# Patient Record
Sex: FEMALE | Race: WHITE | NOT HISPANIC OR LATINO | Employment: FULL TIME | RURAL
[De-identification: names, ages, dates, MRNs, and addresses within clinical notes are randomized per-mention and may not be internally consistent; named-entity substitution may affect disease eponyms.]

---

## 2018-04-11 ENCOUNTER — OFFICE VISIT (OUTPATIENT)
Dept: OBSTETRICS AND GYNECOLOGY | Facility: CLINIC | Age: 43
End: 2018-04-11

## 2018-04-11 VITALS
SYSTOLIC BLOOD PRESSURE: 110 MMHG | HEIGHT: 62 IN | BODY MASS INDEX: 27.12 KG/M2 | DIASTOLIC BLOOD PRESSURE: 70 MMHG | WEIGHT: 147.4 LBS

## 2018-04-11 DIAGNOSIS — Z01.419 ENCOUNTER FOR ANNUAL ROUTINE GYNECOLOGICAL EXAMINATION: Primary | ICD-10-CM

## 2018-04-11 DIAGNOSIS — Z12.31 ENCOUNTER FOR SCREENING MAMMOGRAM FOR BREAST CANCER: ICD-10-CM

## 2018-04-11 DIAGNOSIS — R59.9 LYMPH NODE ENLARGEMENT: ICD-10-CM

## 2018-04-11 DIAGNOSIS — N76.0 BV (BACTERIAL VAGINOSIS): ICD-10-CM

## 2018-04-11 DIAGNOSIS — F34.1 DYSTHYMIA: Chronic | ICD-10-CM

## 2018-04-11 DIAGNOSIS — B96.89 BV (BACTERIAL VAGINOSIS): ICD-10-CM

## 2018-04-11 DIAGNOSIS — E66.3 OVERWEIGHT (BMI 25.0-29.9): Chronic | ICD-10-CM

## 2018-04-11 PROCEDURE — 87624 HPV HI-RISK TYP POOLED RSLT: CPT | Performed by: OBSTETRICS & GYNECOLOGY

## 2018-04-11 PROCEDURE — 99396 PREV VISIT EST AGE 40-64: CPT | Performed by: OBSTETRICS & GYNECOLOGY

## 2018-04-11 PROCEDURE — G0123 SCREEN CERV/VAG THIN LAYER: HCPCS | Performed by: OBSTETRICS & GYNECOLOGY

## 2018-04-11 RX ORDER — METRONIDAZOLE 500 MG/1
500 TABLET ORAL 3 TIMES DAILY
Qty: 21 TABLET | Refills: 0 | Status: SHIPPED | OUTPATIENT
Start: 2018-04-11 | End: 2018-04-18

## 2018-04-11 RX ORDER — LEVOTHYROXINE SODIUM 0.03 MG/1
25 TABLET ORAL DAILY
Refills: 5 | COMMUNITY
Start: 2018-04-03

## 2018-04-11 RX ORDER — BUPROPION HYDROCHLORIDE 150 MG/1
150 TABLET, EXTENDED RELEASE ORAL 2 TIMES DAILY
Qty: 60 TABLET | Refills: 11 | Status: SHIPPED | OUTPATIENT
Start: 2018-04-11

## 2018-04-11 RX ORDER — FLUCONAZOLE 150 MG/1
TABLET ORAL
Qty: 2 TABLET | Refills: 11 | Status: SHIPPED | OUTPATIENT
Start: 2018-04-11

## 2018-04-11 RX ORDER — DOXYCYCLINE 100 MG/1
100 TABLET ORAL 2 TIMES DAILY
Qty: 50 TABLET | Refills: 0 | Status: SHIPPED | OUTPATIENT
Start: 2018-04-11 | End: 2018-05-30 | Stop reason: HOSPADM

## 2018-04-12 NOTE — PROGRESS NOTES
Saba Cassidy is a 43 y.o. y/o female     Chief Complaint: Establish care, annual GYN exam and Pap smear, enlarged lymph node, lethargy, weight gain    HPI: 43-year-old  with one prior  delivery who had a total abdominal hysterectomy and bilateral salpingo-oophorectomy on 2014 for cervical dysplasia.    Dr. Daniele Low is her primary care provider.    She has had an enlarged lymph node in her right groin since 2017.  She has undergone 2 or 3 rounds of treatment with Augmentin without change in this lymph node.    She has hypothyroidism for which she takes Synthroid 25 µg per day.    She has lethargy, weight gain, and dysthymia.    No known drug allergies.    She is .    She does not smoke or use smokeless tobacco, and she never has.    Mother age 64 is diabetic and has hypothyroidism.  Father  at age 65 of a massive heart attack.  He had hypertension.  She has 43-year-old and 46-year-old siblings.  No family history of breast cancer, uterine cancer, ovarian cancer, or colon cancer.    We discussed trying Wellbutrin, and she is interested in trying this.    Review of Systems   Constitutional: Positive for fatigue. Negative for activity change, appetite change, chills, diaphoresis, fever and unexpected weight change.   Gastrointestinal: Negative for abdominal pain, constipation, diarrhea and nausea.   Genitourinary: Positive for vaginal discharge. Negative for difficulty urinating, dyspareunia, dysuria, pelvic pain, urgency, vaginal bleeding and vaginal pain.   Neurological: Negative for headaches.   Psychiatric/Behavioral: Positive for dysphoric mood. The patient is not nervous/anxious.    All other systems reviewed and are negative.     Breast ROS: negative    The following portions of the patient's history were reviewed and updated as appropriate: allergies, current medications, past family history, past medical history, past social history, past surgical  history and problem list.    No Known Allergies       Current Outpatient Prescriptions:   •  levothyroxine (SYNTHROID, LEVOTHROID) 25 MCG tablet, Take 25 mcg by mouth Daily., Disp: , Rfl: 5  •  buPROPion SR (WELLBUTRIN SR) 150 MG 12 hr tablet, Take 1 tablet by mouth 2 (Two) Times a Day., Disp: 60 tablet, Rfl: 11  •  doxycycline (ADOXA) 100 MG tablet, Take 1 tablet by mouth 2 (Two) Times a Day., Disp: 50 tablet, Rfl: 0  •  fluconazole (DIFLUCAN) 150 MG tablet, Take one po today and take one po in 4 days., Disp: 2 tablet, Rfl: 11  •  metroNIDAZOLE (FLAGYL) 500 MG tablet, Take 1 tablet by mouth 3 (Three) Times a Day for 7 days., Disp: 21 tablet, Rfl: 0     The patient has a family history of   Family History   Problem Relation Age of Onset   • Hypertension Father    • Heart attack Father    • Diabetes Mother    • Thyroid disease Mother         Past Medical History:   Diagnosis Date   • Cervical cancer    • Dysthymia 2018   • Lymph node enlargement 2018   • Overweight (BMI 25.0-29.9) 2018   • Thyroid disease         OB History      Para Term  AB Living    1 1  1  1    SAB TAB Ectopic Molar Multiple Live Births         1           Social History     Social History   • Marital status:      Spouse name: N/A   • Number of children: N/A   • Years of education: N/A     Occupational History   • Not on file.     Social History Main Topics   • Smoking status: Never Smoker   • Smokeless tobacco: Never Used   • Alcohol use No   • Drug use: No   • Sexual activity: Not on file     Other Topics Concern   • Not on file     Social History Narrative   • No narrative on file        Past Surgical History:   Procedure Laterality Date   • BREAST AUGMENTATION  2014   •  SECTION     • HYSTERECTOMY  2014   • WISDOM TOOTH EXTRACTION          Patient Active Problem List   Diagnosis   • Lymph node enlargement   • Dysthymia   • Overweight (BMI 25.0-29.9)        Documented Vitals     "04/11/18 1323   BP: 110/70   Weight: 66.9 kg (147 lb 6.4 oz)   Height: 157.5 cm (62\")       Physical Exam   Constitutional: She is oriented to person, place, and time. She appears well-developed and well-nourished. No distress.   HENT:   Head: Normocephalic and atraumatic.   Eyes: Conjunctivae and EOM are normal. Pupils are equal, round, and reactive to light.   Neck: Normal range of motion. Neck supple. No JVD present. No tracheal deviation present. No thyromegaly present.   Cardiovascular: Normal rate, regular rhythm, normal heart sounds and intact distal pulses.  Exam reveals no gallop and no friction rub.    No murmur heard.  Pulmonary/Chest: Effort normal and breath sounds normal. No stridor. No respiratory distress. She has no wheezes. She has no rales. She exhibits no tenderness. Right breast exhibits no inverted nipple, no mass, no nipple discharge, no skin change and no tenderness. Left breast exhibits no inverted nipple, no mass, no nipple discharge, no skin change and no tenderness. Breasts are symmetrical. There is no breast swelling.   Abdominal: Soft. Bowel sounds are normal. She exhibits no distension and no mass. There is no tenderness. There is no rebound and no guarding. No hernia. Hernia confirmed negative in the right inguinal area and confirmed negative in the left inguinal area.   Genitourinary: Rectal exam shows no external hemorrhoid, no internal hemorrhoid, no fissure, no mass, no tenderness, anal tone normal and guaiac negative stool. No breast tenderness, discharge or bleeding. No labial fusion. There is no rash, tenderness, lesion or injury on the right labia. There is no rash, tenderness, lesion or injury on the left labia. Vaginal discharge found.   Genitourinary Comments: Bacterial vaginosis.  Pap smear of the vaginal cuff performed.  Cervix surgically absent.  Uterus surgically absent.  Adnexa surgically absent.  No pelvic masses palpated.     Musculoskeletal: Normal range of motion. " She exhibits no edema, tenderness or deformity.   Lymphadenopathy:     She has no cervical adenopathy.        Right: No inguinal adenopathy present.        Left: No inguinal adenopathy present.   Neurological: She is alert and oriented to person, place, and time. She has normal reflexes. She displays normal reflexes. No cranial nerve deficit. She exhibits normal muscle tone. Coordination normal.   Skin: Skin is warm and dry. No rash noted. She is not diaphoretic. No erythema. No pallor.   Psychiatric: She has a normal mood and affect. Her behavior is normal. Judgment and thought content normal.   Nursing note and vitals reviewed.       Assessment        Diagnosis Plan   1. Encounter for annual routine gynecological examination  Liquid-based Pap Smear, Screening   2. Lymph node enlargement     3. BV (bacterial vaginosis)     4. Dysthymia     5. Overweight (BMI 25.0-29.9)     6. Encounter for screening mammogram for breast cancer  Mammo Screening Digital Tomosynthesis Bilateral With CAD         Plan      1. Wellbutrin  mg by mouth twice a day.  2. Flagyl and Diflucan.  3. Doxycycline.  4. Encouraged in diet and exercise.  5. Handouts on depression, hot flashes, exercise, and vitamin use.   6. Follow-up in 6 months.  Follow-up sooner as needed.  7. Note to Dr. Daniele Low.            This document has been electronically signed by Omari Dumont MD on April 11, 2018 7:18 PM

## 2018-04-18 LAB
LAB AP CASE REPORT: NORMAL
LAB AP GYN ADDITIONAL INFORMATION: NORMAL
LAB AP GYN OTHER FINDINGS: NORMAL
Lab: NORMAL
PATH INTERP SPEC-IMP: NORMAL
STAT OF ADQ CVX/VAG CYTO-IMP: NORMAL

## 2018-04-20 LAB — HPV I/H RISK 4 DNA CVX QL PROBE+SIG AMP: NEGATIVE

## 2018-05-30 ENCOUNTER — OFFICE VISIT (OUTPATIENT)
Dept: OBSTETRICS AND GYNECOLOGY | Facility: CLINIC | Age: 43
End: 2018-05-30

## 2018-05-30 VITALS
SYSTOLIC BLOOD PRESSURE: 104 MMHG | HEIGHT: 62 IN | DIASTOLIC BLOOD PRESSURE: 76 MMHG | BODY MASS INDEX: 24.99 KG/M2 | WEIGHT: 135.8 LBS

## 2018-05-30 DIAGNOSIS — R59.9 LYMPH NODE ENLARGEMENT: ICD-10-CM

## 2018-05-30 DIAGNOSIS — E66.3 OVERWEIGHT (BMI 25.0-29.9): Chronic | ICD-10-CM

## 2018-05-30 DIAGNOSIS — F34.1 DYSTHYMIA: Primary | ICD-10-CM

## 2018-05-30 PROCEDURE — 99213 OFFICE O/P EST LOW 20 MIN: CPT | Performed by: OBSTETRICS & GYNECOLOGY

## 2018-05-31 NOTE — PROGRESS NOTES
Saba Cassidy is a 43 y.o. y/o female     Chief Complaint: Resolution of enlarged lymph node, improvement and lethargy, and difficulty losing weight.    HPI: 43-year-old  with one prior  delivery who had a total abdominal hysterectomy and bilateral salpingo-oophorectomy on 2014 for cervical dysplasia.     Dr. Daniele Low is her primary care provider.     She has had an enlarged lymph node in her right groin since 2017.  She has undergone 2 or 3 rounds of treatment with Augmentin without change in this lymph node.     She has hypothyroidism for which she takes Synthroid 25 µg per day.     She has lethargy, weight gain, and dysthymia.     No known drug allergies.     She is .     She does not smoke or use smokeless tobacco, and she never has.     Mother age 64 is diabetic and has hypothyroidism.  Father  at age 65 of a massive heart attack.  He had hypertension.  She has 43-year-old and 46-year-old siblings.  No family history of breast cancer, uterine cancer, ovarian cancer, or colon cancer.     We discussed trying Wellbutrin, and she started Wellbutrin 2018.    May 30, 2018, she is taking the Wellbutrin  mg twice a day and is very happy with that.  She has been able to lose close to 12 pounds, and she feels very well.  She took the Flagyl and doxycycline, but had to stop the doxycycline after a few days.  Once she completed the Flagyl, she tried it again and could only take it up another 5 days, but the swollen lymph node in her right groin has resolved.    Review of Systems   Constitutional: Negative for activity change, appetite change, chills, diaphoresis, fatigue, fever and unexpected weight change.   Gastrointestinal: Negative for abdominal pain, constipation, diarrhea and nausea.   Genitourinary: Negative for difficulty urinating, dyspareunia, dysuria, pelvic pain, urgency, vaginal bleeding, vaginal discharge and vaginal pain.    Neurological: Negative for headaches.   Psychiatric/Behavioral: The patient is not nervous/anxious.    All other systems reviewed and are negative.     Breast ROS: negative    The following portions of the patient's history were reviewed and updated as appropriate: allergies, current medications, past family history, past medical history, past social history, past surgical history and problem list.    No Known Allergies       Current Outpatient Prescriptions:   •  buPROPion SR (WELLBUTRIN SR) 150 MG 12 hr tablet, Take 1 tablet by mouth 2 (Two) Times a Day., Disp: 60 tablet, Rfl: 11  •  levothyroxine (SYNTHROID, LEVOTHROID) 25 MCG tablet, Take 25 mcg by mouth Daily., Disp: , Rfl: 5  •  fluconazole (DIFLUCAN) 150 MG tablet, Take one po today and take one po in 4 days., Disp: 2 tablet, Rfl: 11     The patient has a family history of   Family History   Problem Relation Age of Onset   • Hypertension Father    • Heart attack Father    • Diabetes Mother    • Thyroid disease Mother         Past Medical History:   Diagnosis Date   • Cervical cancer    • Dysthymia 2018   • Lymph node enlargement 2018   • Overweight (BMI 25.0-29.9) 2018   • Thyroid disease         OB History      Para Term  AB Living    2 2 1 1   1    SAB TAB Ectopic Molar Multiple Live Births              1           Social History     Social History   • Marital status:      Spouse name: N/A   • Number of children: N/A   • Years of education: N/A     Occupational History   • Not on file.     Social History Main Topics   • Smoking status: Never Smoker   • Smokeless tobacco: Never Used   • Alcohol use No   • Drug use: No   • Sexual activity: Not on file     Other Topics Concern   • Not on file     Social History Narrative   • No narrative on file        Past Surgical History:   Procedure Laterality Date   • AUGMENTATION MAMMAPLASTY     • BREAST AUGMENTATION  2014   •  SECTION     • HYSTERECTOMY   "07/07/2014   • WISDOM TOOTH EXTRACTION          Patient Active Problem List   Diagnosis   • Dysthymia        Documented Vitals    05/30/18 0942   BP: 104/76   Weight: 61.6 kg (135 lb 12.8 oz)   Height: 157.5 cm (62\")       Physical Exam   Constitutional: She is oriented to person, place, and time. She appears well-developed and well-nourished. No distress.   135.8 pounds with BMI 24.8.   HENT:   Head: Normocephalic and atraumatic.   Eyes: Conjunctivae and EOM are normal. Pupils are equal, round, and reactive to light.   Neck: Normal range of motion. Neck supple. No JVD present. No tracheal deviation present. No thyromegaly present.   Cardiovascular: Normal rate, regular rhythm, normal heart sounds and intact distal pulses.  Exam reveals no gallop and no friction rub.    No murmur heard.  Pulmonary/Chest: Effort normal and breath sounds normal. No stridor. No respiratory distress. She has no wheezes. She has no rales. She exhibits no tenderness.   Abdominal: Soft. Bowel sounds are normal. She exhibits no distension and no mass. There is no tenderness. There is no rebound and no guarding. No hernia. Hernia confirmed negative in the right inguinal area and confirmed negative in the left inguinal area.   Genitourinary: Vagina normal. No labial fusion. There is no rash, tenderness, lesion or injury on the right labia. There is no rash, tenderness, lesion or injury on the left labia.   Genitourinary Comments: Adenopathy and vaginal discharge have resolved.   Musculoskeletal: Normal range of motion. She exhibits no edema, tenderness or deformity.   Lymphadenopathy:     She has no cervical adenopathy.        Right: No inguinal adenopathy present.        Left: No inguinal adenopathy present.   Neurological: She is alert and oriented to person, place, and time. She has normal reflexes. She displays normal reflexes. No cranial nerve deficit. She exhibits normal muscle tone. Coordination normal.   Skin: Skin is warm and dry. No " rash noted. She is not diaphoretic. No erythema. No pallor.   Psychiatric: She has a normal mood and affect. Her behavior is normal. Judgment and thought content normal.   Nursing note and vitals reviewed.       Assessment        Diagnosis Plan   1. Dysthymia     2. Lymph node enlargement     3. Overweight (BMI 25.0-29.9)           Plan      1. Continue Wellbutrin  mg twice a day.  2. Encouraged in diet and exercise.  3. Follow-up in 3 months.  Follow-up sooner as needed.  4. Note to Dr. Daniele Low.            This document has been electronically signed by Omari Dumont MD on May 30, 2018 9:25 PM